# Patient Record
Sex: MALE | Race: WHITE | NOT HISPANIC OR LATINO | Employment: FULL TIME | URBAN - METROPOLITAN AREA
[De-identification: names, ages, dates, MRNs, and addresses within clinical notes are randomized per-mention and may not be internally consistent; named-entity substitution may affect disease eponyms.]

---

## 2018-06-28 ENCOUNTER — OFFICE VISIT - HEALTHEAST (OUTPATIENT)
Dept: FAMILY MEDICINE | Facility: CLINIC | Age: 38
End: 2018-06-28

## 2018-06-28 DIAGNOSIS — J01.90 ACUTE SINUSITIS WITH SYMPTOMS > 10 DAYS: ICD-10-CM

## 2018-06-28 DIAGNOSIS — H61.23 BILATERAL IMPACTED CERUMEN: ICD-10-CM

## 2018-06-28 ASSESSMENT — MIFFLIN-ST. JEOR: SCORE: 1472.38

## 2019-03-16 ENCOUNTER — OFFICE VISIT - HEALTHEAST (OUTPATIENT)
Dept: FAMILY MEDICINE | Facility: CLINIC | Age: 39
End: 2019-03-16

## 2019-03-16 DIAGNOSIS — H61.23 BILATERAL IMPACTED CERUMEN: ICD-10-CM

## 2019-03-16 DIAGNOSIS — R03.0 ELEVATED BLOOD PRESSURE READING WITHOUT DIAGNOSIS OF HYPERTENSION: ICD-10-CM

## 2021-06-01 VITALS — HEIGHT: 67 IN | WEIGHT: 132 LBS | BODY MASS INDEX: 20.72 KG/M2

## 2021-06-02 VITALS — BODY MASS INDEX: 22.95 KG/M2 | WEIGHT: 146.56 LBS

## 2021-06-17 NOTE — PATIENT INSTRUCTIONS - HE
Patient Instructions by Bakari Garner PA-C at 3/16/2019 11:00 AM     Author: Bakari Garner PA-C Service: -- Author Type: Physician Assistant    Filed: 3/16/2019 12:04 PM Encounter Date: 3/16/2019 Status: Addendum    : Bakari Garner PA-C (Physician Assistant)    Related Notes: Original Note by Bakari Garner PA-C (Physician Assistant) filed at 3/16/2019 12:01 PM       You had earwax removed with a curette on the right ear.  Because the ear wax adhered to the canal there was some bleeding on the edge of the canal.  Use a cotton ball or gauze in the ear canal to help catch any drainage.  Keep the ear canal clean dry and protected.  Do not immerse her head underwater for the next 3 days.  If you should develop a fever, discharge or pain that is worsening over the next 3-5 days from the ear return to clinic with walk-in care or your primary care provider for reevaluation and treatment of possible infection.  You may take over-the-counter Tylenol for comfort dosing by packaging directions.  Otherwise, follow-up in 10-14 days for reevaluation of the right ear and removal of the remaining cerumen.    Patient to follow up with Primary Care provider regarding elevated blood pressure.      Patient Education     Earwax Removal    The ear canal makes earwax from the canals lining. The ears make wax to lubricate and protect the ear canal. The ear canal is the tube that connects the middle ear to the outside of the ear. The wax protects the ear from bacteria, infection, and damage from water or trauma.  The wax that forms in the canal naturally moves toward the outside of the ear and falls out. In some cases, the ear may make too much wax. If the wax causes problems or keeps the healthcare provider from seeing into the ear, the extra wax may be removed.  Too much wax can affect your hearing. It can cause itching. In rare cases, it can be painful. Earwax should not be removed unless it is causing a problem. You should not  stick objects into your ear to remove wax unless told to do so by your healthcare provider.  Healthcare providers can remove earwax safely. It is important to stay still during the procedure to avoid damage to the ear canal. But removing earwax generally doesnt hurt. You will not usually need anesthesia or pain medicine when the provider removes the earwax.  A number of conditions lead to earwax buildup. These include some skin problems, a narrow ear canal, or ears that make too much earwax. Using cotton swabs in the canal pushes earwax deeper into the ear and contributes to the buildup of earwax.  Home care    The healthcare provider may recommend mineral oil or an over-the-counter eardrop to use at home to soften the earwax. Use these products only if the provider recommends them. Carefully follow the instructions given.    Dont use mineral oil or OTC eardrops if you might have an ear infection or a ruptured eardrum. Tell your healthcare provider right away if you have diabetes or an immune disorder.    Dont use cotton swabs in your ears. Cotton swabs may push wax deeper into the ear canal or damage the eardrum. Use cotton gauze or a wet washcloth  to gently remove wax on the outside of the ear and around the opening to the ear canal.    Don't use any probing device or object such as cotton-tipped swabs or cornell pins to clean the inside of your ears.    Dont use ear candles to clean your ears. Candling can be dangerous. It can burn the ear canal. It can also make the condition worse instead of better.    Dont use cold water to rinse the ear. This will make you dizzy. If your provider tells you to rinse your ear, use only warm water or follow his or her instructions.    Check the ear for signs of infection or irritation listed below under When to seek medical advice.  Steps for using eardrops  1. Warm the medicine bottle by rubbing it between your hands for a few minutes.  2. Lie down on your side, with the  affected ear up.  3. Place the recommended number of drops in the ear. Wet a cotton ball with the medicine. Gently put the cotton ball into the ear opening.  Follow-up care  Follow up with your healthcare provider, or as directed.  When to seek medical advice  Call the provider right away if you have:    Ear pain that gets worse    Fever of 100.4F F (38 C) or higher, or as directed by your healthcare provider    Worsening wax buildup    Severe pain, dizziness, or nausea    Bleeding from the ear    Hearing problems    Signs of irritation from the eardrops, such as burning, stinging, or swelling and tenderness    Foul-smelling fluid draining from the ear    Swelling, redness, or tenderness of the outer ear    Headache, neck pain, or stiff neck  Date Last Reviewed: 6/1/2017 2000-2017 [employer]. [address] [place] [id number]. All rights reserved. This information is not intended as a substitute for professional medical care. Always follow your healthcare professional's instructions.           Patient Education     Impacted Earwax     Inner ear structures including ear canal and eardrum.     Impacted earwax is a buildup of the natural wax in the ear (cerumen). Impacted earwax is very common. It can cause symptoms such as hearing loss. It can also make it difficult for a doctor to examine your ear.  Understanding earwax  Tiny glands in your ear make substances that combine with dead skin cells to form earwax. Earwax helps protect your ear canal from water, dirt, infection, and injury. Over time, earwax travels from the inner part of your ear canal to the entrance of the canal. Then it falls away naturally. But in some cases, it cant travel to the entrance of the canal. This may be because of a health condition or objects put in the ear. With age, earwax tends to become harder and less fluid. Older adults are more likely to have problems with earwax buildup.  What causes impacted  earwax?  Earwax can build up because of many health conditions. Some cause a physical blockage. Others cause too much earwax to be made. Health conditions that can cause earwax buildup include:    Use of cotton swabs to clean deep in the ear canal    Bony blockage in the ear (osteoma or exostoses)    Infections, such as  infection of the outer ear (external otitis)    Skin disease, such as eczema    Autoimmune diseases, such as lupus    A narrowed ear canal from birth, chronic inflammation, or injury    Too much earwax because of injury    Too much earwax because of  water in the ear canal  Objects repeatedly placed in the ear can also cause impacted earwax. For example, putting cotton swabs in the ear may push the wax deeper into the ear. Over time, this may cause blockage. Hearing aids, swimming plugs, and swim molds can cause the same problem when used again and again.  In some cases, the cause of impacted earwax is not known.  Symptoms of impacted earwax  Excess earwax usually does not cause any symptoms, unless there is a large amount of buildup. Then it may cause symptoms such as:    Hearing loss    Earache    Sense of ear fullness    Itching in the ear    Odor from the ear    Ear drainage    Dizziness    Ringing in the ears    Cough  Treatment for impacted earwax  If you dont have symptoms, you may not need treatment. Often, the earwax goes away on its own with time. If you have symptoms, you may have one or more treatments such as:    Eardrops to soften the earwax. This helps it leave the ear over time.    Rinsing (irrigation) of the ear canal with water. This is done in a doctors office.    Removal of the earwax with small tools. This is also done in a doctors office.  In rare cases, some treatments for earwax removal may cause complications such as:    Infection of the outer ear (otitis external)    Earache    Short-term hearing loss    Dizziness    Water trapped in the ear canal    Hole in the  eardrum    Ringing in the ears    Bleeding from the ear  Talk with your healthcare provider about which risks apply most to you.  Dont use these at home  Healthcare providers do not advise use of ear candles or ear vacuum kits. These methods are not shown to work and may cause problems.   Preventing impacted earwax  You may not be able to prevent impacted earwax if you have a health condition that causes it, such as eczema. In other cases, you may be able to prevent earwax buildup by:    Using ear drops once a week    Having routine cleaning of the ear about every 6 months    Not using cotton swabs in the ear  When to call the healthcare provider  Call your healthcare provider if you have symptoms of impacted earwax. Also call right away if you have severe symptoms after earwax removal. These may include bleeding or severe ear pain.   Date Last Reviewed: 5/1/2017 2000-2017 [employer]. [address] [place] [address], [place] [address]. All rights reserved. This information is not intended as a substitute for professional medical care. Always follow your healthcare professional's instructions.

## 2021-06-19 NOTE — PROGRESS NOTES
"OFFICE VISIT - FAMILY MEDICINE     ASSESSMENT AND PLAN     1. Acute sinusitis with symptoms > 10 days  amoxicillin (AMOXIL) 875 MG tablet   2. Bilateral impacted cerumen     Continue with good hydration, extra vitamin C, cough drops as needed, take the prescribed antibiotic as directed, possible side effect discussed.  Return if not improving.  Trial of over-the-counter Debrox at home and follow-up here as needed.    CHIEF COMPLAINT   Facial Pain (X 06/22)    HPI   Sanjay Gomez is a 37 y.o. male.  No Patient Care Coordination Note on file.    Cold symptoms about 2 weeks ago, lasted for about 1 week, better with symptomatic care, but now feeling more pressure in his maxillary sinuses area, not improving with symptomatic care like Sudafed or nasal washes, has had one about a year ago treated with antibiotic.  Denies any headaches or dizziness no hearing loss.      Review of Systems As per HPI, otherwise negative.    OBJECTIVE   /80 (Patient Site: Left Arm)  Pulse 64  Temp 98.7  F (37.1  C) (Oral)   Resp 13  Ht 5' 7\" (1.702 m)  Wt 132 lb (59.9 kg)  BMI 20.67 kg/m2  Physical Exam   Constitutional: He is oriented to person, place, and time. He appears well-developed and well-nourished.   HENT:   Head: Normocephalic and atraumatic.   Nose: Right sinus exhibits maxillary sinus tenderness. Left sinus exhibits maxillary sinus tenderness.   Bilateral cerumen impaction with no pain   Neck: Normal range of motion. Neck supple. No JVD present. No tracheal deviation present. No thyromegaly present.   Cardiovascular: Normal rate, regular rhythm, normal heart sounds and intact distal pulses.  Exam reveals no gallop and no friction rub.    No murmur heard.  Pulmonary/Chest: Effort normal and breath sounds normal. No respiratory distress. He has no wheezes. He has no rales.   Musculoskeletal: He exhibits no edema or tenderness.   Lymphadenopathy:     He has no cervical adenopathy.   Neurological: He is alert and " oriented to person, place, and time. Coordination normal.   Psychiatric: He has a normal mood and affect. Judgment and thought content normal.       [facility]     Family History   Problem Relation Age of Onset     Hypertension Maternal Grandmother      Social History     Social History     Marital status:      Spouse name: N/A     Number of children: N/A     Years of education: N/A     Occupational History     Not on file.     Social History Main Topics     Smoking status: Never Smoker     Smokeless tobacco: Never Used     Alcohol use 0.6 oz/week     1 Standard drinks or equivalent per week     Drug use: No     Sexual activity: Not on file     Other Topics Concern     Not on file     Social History Narrative     Relevant history was reviewed with the patient today, unless noted in HPI, nothing is pertinent for this visit.  [facility]   There are no active problems to display for this patient.    No past surgical history on file.    RESULTS/CONSULTS (Lab/Rad)   No results found for this or any previous visit (from the past 168 hour(s)).  No results found.  MEDICATIONS     No current outpatient prescriptions on file prior to visit.     No current facility-administered medications on file prior to visit.        HEALTH MAINTENANCE / SCREENING   No Data Recorded, No Data Recorded,No Data Recorded  Immunization History   Administered Date(s) Administered     Gamma Globulin 03/29/1995     MMR 05/18/1994     Td, adult adsorbed, PF 05/18/2009     Td,adult,historic,unspecified 05/18/2009     Tdap 05/18/2009     Health Maintenance   Topic     TDAP ADULT ONE TIME DOSE      ADVANCE DIRECTIVES DISCUSSED WITH PATIENT      INFLUENZA VACCINE RULE BASED (Season Ended)     TD 18+ HE        Molly Franklin MD  Family Medicine, [facility]     This note was dictated using a voice recognition software.  Any grammatical or context distortion are unintentional and inherent to the software.

## 2021-06-27 NOTE — PROGRESS NOTES
Progress Notes by Bakari Garner PA-C at 3/16/2019 11:00 AM     Author: Bakari Garner PA-C Service: -- Author Type: Physician Assistant    Filed: 3/16/2019 12:57 PM Encounter Date: 3/16/2019 Status: Signed    : Bakari Garner PA-C (Physician Assistant)       Subjective:      Patient ID: Sanjay Gomez is a 38 y.o. male.    Chief Complaint:    HPI  Sanjay Gomez is a 38 y.o. male who presents today complaining of right-sided ear pain and plugging with decrease in hearing over the last 3 days.  Patient recounts past medical history for going to his hairdresser and had the hairdresser spray water in the right ear.  It was painful and he had then decrease in hearing because of the blockage.    Incidental note of elevated blood pressure noted in the office.    No past medical history on file.    No past surgical history on file.    Family History   Problem Relation Age of Onset   ? Hypertension Maternal Grandmother        Social History     Tobacco Use   ? Smoking status: Never Smoker   ? Smokeless tobacco: Never Used   Substance Use Topics   ? Alcohol use: Yes     Alcohol/week: 0.6 oz     Types: 1 Standard drinks or equivalent per week   ? Drug use: No       Review of Systems  As above in HPI, otherwise balance of Review of Systems are negative.    Objective:     /76 (Patient Site: Right Arm, Patient Position: Sitting, Cuff Size: Adult Regular)   Pulse 75   Temp 98.4  F (36.9  C) (Oral)   Resp 16   Wt 146 lb 9 oz (66.5 kg)   SpO2 98%   BMI 22.95 kg/m      Physical Exam  General: Patient is resting comfortably no acute distress is afebrile  HEENT: Head is normocephalic atraumatic   eyes are PERRL EOMI sclera anicteric   TMs on the right obstructed by cerumen impaction.  Using a curette the cerumen was removed.  There was some bleeding off of the external auditory canal because of the adherence of the cerumen.  A very large plug of cerumen was removed.  TM on the right was not fully visualized  secondary to the bleeding and still some cerumen remaining up against the tympanic membrane.  EAC on the left is with mild cerumen.  Using lavage the left external auditory canal was cleaned   TM on the left is clear  No cervical lymphadenopathy present  Skin: Without rash non-diaphoretic    Assessment:     Procedures    The primary encounter diagnosis was Elevated blood pressure reading without diagnosis of hypertension. A diagnosis of Bilateral impacted cerumen was also pertinent to this visit.    Plan:       1. Elevated blood pressure reading without diagnosis of hypertension     2. Bilateral impacted cerumen  Nursing communication       Advised the patient that there was some bleeding from the right external auditory canal after removal of the cerumen with a curette.  Advised him that this is potentially common by removing the wax that adheres to the side of the canal during the dry winter months like this.  He should keep the head clean dry and protected with not immersing the external auditory canal in water or swimming.  Indication for return to clinic was gone over if he should develop signs or symptoms of infection.  He can have the ear evaluated on the right in 2 weeks to remove the remaining cerumen from the external auditory canal.  Shins were answered to patient's satisfaction before discharge    Patient Instructions     You had earwax removed with a curette on the right ear.  Because the ear wax adhered to the canal there was some bleeding on the edge of the canal.  Use a cotton ball or gauze in the ear canal to help catch any drainage.  Keep the ear canal clean dry and protected.  Do not immerse her head underwater for the next 3 days.  If you should develop a fever, discharge or pain that is worsening over the next 3-5 days from the ear return to clinic with walk-in care or your primary care provider for reevaluation and treatment of possible infection.  You may take over-the-counter Tylenol for  comfort dosing by packaging directions.  Otherwise, follow-up in 10-14 days for reevaluation of the right ear and removal of the remaining cerumen.    Patient to follow up with Primary Care provider regarding elevated blood pressure.      Patient Education     Earwax Removal    The ear canal makes earwax from the canals lining. The ears make wax to lubricate and protect the ear canal. The ear canal is the tube that connects the middle ear to the outside of the ear. The wax protects the ear from bacteria, infection, and damage from water or trauma.  The wax that forms in the canal naturally moves toward the outside of the ear and falls out. In some cases, the ear may make too much wax. If the wax causes problems or keeps the healthcare provider from seeing into the ear, the extra wax may be removed.  Too much wax can affect your hearing. It can cause itching. In rare cases, it can be painful. Earwax should not be removed unless it is causing a problem. You should not stick objects into your ear to remove wax unless told to do so by your healthcare provider.  Healthcare providers can remove earwax safely. It is important to stay still during the procedure to avoid damage to the ear canal. But removing earwax generally doesnt hurt. You will not usually need anesthesia or pain medicine when the provider removes the earwax.  A number of conditions lead to earwax buildup. These include some skin problems, a narrow ear canal, or ears that make too much earwax. Using cotton swabs in the canal pushes earwax deeper into the ear and contributes to the buildup of earwax.  Home care    The healthcare provider may recommend mineral oil or an over-the-counter eardrop to use at home to soften the earwax. Use these products only if the provider recommends them. Carefully follow the instructions given.    Dont use mineral oil or OTC eardrops if you might have an ear infection or a ruptured eardrum. Tell your healthcare provider right  away if you have diabetes or an immune disorder.    Dont use cotton swabs in your ears. Cotton swabs may push wax deeper into the ear canal or damage the eardrum. Use cotton gauze or a wet washcloth  to gently remove wax on the outside of the ear and around the opening to the ear canal.    Don't use any probing device or object such as cotton-tipped swabs or cornell pins to clean the inside of your ears.    Dont use ear candles to clean your ears. Candling can be dangerous. It can burn the ear canal. It can also make the condition worse instead of better.    Dont use cold water to rinse the ear. This will make you dizzy. If your provider tells you to rinse your ear, use only warm water or follow his or her instructions.    Check the ear for signs of infection or irritation listed below under When to seek medical advice.  Steps for using eardrops  1. Warm the medicine bottle by rubbing it between your hands for a few minutes.  2. Lie down on your side, with the affected ear up.  3. Place the recommended number of drops in the ear. Wet a cotton ball with the medicine. Gently put the cotton ball into the ear opening.  Follow-up care  Follow up with your healthcare provider, or as directed.  When to seek medical advice  Call the provider right away if you have:    Ear pain that gets worse    Fever of 100.4F F (38 C) or higher, or as directed by your healthcare provider    Worsening wax buildup    Severe pain, dizziness, or nausea    Bleeding from the ear    Hearing problems    Signs of irritation from the eardrops, such as burning, stinging, or swelling and tenderness    Foul-smelling fluid draining from the ear    Swelling, redness, or tenderness of the outer ear    Headache, neck pain, or stiff neck  Date Last Reviewed: 6/1/2017 2000-2017 [employer]. [address] [place] [address], [place] [address]. All rights reserved. This information is not intended as a substitute for professional medical care. Always  follow your healthcare professional's instructions.           Patient Education     Impacted Earwax     Inner ear structures including ear canal and eardrum.     Impacted earwax is a buildup of the natural wax in the ear (cerumen). Impacted earwax is very common. It can cause symptoms such as hearing loss. It can also make it difficult for a doctor to examine your ear.  Understanding earwax  Tiny glands in your ear make substances that combine with dead skin cells to form earwax. Earwax helps protect your ear canal from water, dirt, infection, and injury. Over time, earwax travels from the inner part of your ear canal to the entrance of the canal. Then it falls away naturally. But in some cases, it cant travel to the entrance of the canal. This may be because of a health condition or objects put in the ear. With age, earwax tends to become harder and less fluid. Older adults are more likely to have problems with earwax buildup.  What causes impacted earwax?  Earwax can build up because of many health conditions. Some cause a physical blockage. Others cause too much earwax to be made. Health conditions that can cause earwax buildup include:    Use of cotton swabs to clean deep in the ear canal    Bony blockage in the ear (osteoma or exostoses)    Infections, such as  infection of the outer ear (external otitis)    Skin disease, such as eczema    Autoimmune diseases, such as lupus    A narrowed ear canal from birth, chronic inflammation, or injury    Too much earwax because of injury    Too much earwax because of  water in the ear canal  Objects repeatedly placed in the ear can also cause impacted earwax. For example, putting cotton swabs in the ear may push the wax deeper into the ear. Over time, this may cause blockage. Hearing aids, swimming plugs, and swim molds can cause the same problem when used again and again.  In some cases, the cause of impacted earwax is not known.  Symptoms of impacted earwax  Excess  earwax usually does not cause any symptoms, unless there is a large amount of buildup. Then it may cause symptoms such as:    Hearing loss    Earache    Sense of ear fullness    Itching in the ear    Odor from the ear    Ear drainage    Dizziness    Ringing in the ears    Cough  Treatment for impacted earwax  If you dont have symptoms, you may not need treatment. Often, the earwax goes away on its own with time. If you have symptoms, you may have one or more treatments such as:    Eardrops to soften the earwax. This helps it leave the ear over time.    Rinsing (irrigation) of the ear canal with water. This is done in a doctors office.    Removal of the earwax with small tools. This is also done in a doctors office.  In rare cases, some treatments for earwax removal may cause complications such as:    Infection of the outer ear (otitis external)    Earache    Short-term hearing loss    Dizziness    Water trapped in the ear canal    Hole in the eardrum    Ringing in the ears    Bleeding from the ear  Talk with your healthcare provider about which risks apply most to you.  Dont use these at home  Healthcare providers do not advise use of ear candles or ear vacuum kits. These methods are not shown to work and may cause problems.   Preventing impacted earwax  You may not be able to prevent impacted earwax if you have a health condition that causes it, such as eczema. In other cases, you may be able to prevent earwax buildup by:    Using ear drops once a week    Having routine cleaning of the ear about every 6 months    Not using cotton swabs in the ear  When to call the healthcare provider  Call your healthcare provider if you have symptoms of impacted earwax. Also call right away if you have severe symptoms after earwax removal. These may include bleeding or severe ear pain.   Date Last Reviewed: 5/1/2017 2000-2017 [employer]. [address] [place] [address], [place] [address]. All rights reserved. This  information is not intended as a substitute for professional medical care. Always follow your healthcare professional's instructions.

## 2021-08-07 ENCOUNTER — HEALTH MAINTENANCE LETTER (OUTPATIENT)
Age: 41
End: 2021-08-07

## 2021-10-02 ENCOUNTER — HEALTH MAINTENANCE LETTER (OUTPATIENT)
Age: 41
End: 2021-10-02

## 2022-09-03 ENCOUNTER — HEALTH MAINTENANCE LETTER (OUTPATIENT)
Age: 42
End: 2022-09-03

## 2023-01-19 ENCOUNTER — OFFICE VISIT (OUTPATIENT)
Dept: URGENT CARE | Facility: URGENT CARE | Age: 43
End: 2023-01-19
Payer: COMMERCIAL

## 2023-01-19 VITALS
BODY MASS INDEX: 21.19 KG/M2 | RESPIRATION RATE: 16 BRPM | OXYGEN SATURATION: 99 % | DIASTOLIC BLOOD PRESSURE: 83 MMHG | HEART RATE: 93 BPM | TEMPERATURE: 98.6 F | WEIGHT: 135 LBS | HEIGHT: 67 IN | SYSTOLIC BLOOD PRESSURE: 136 MMHG

## 2023-01-19 DIAGNOSIS — R21 RASH AND NONSPECIFIC SKIN ERUPTION: Primary | ICD-10-CM

## 2023-01-19 PROCEDURE — 99203 OFFICE O/P NEW LOW 30 MIN: CPT | Performed by: FAMILY MEDICINE

## 2023-01-19 NOTE — PROGRESS NOTES
"Assessment & Plan     Rash and nonspecific skin eruption       Mild Eczematous rash for this I am recommending non-fragrant emollients for maintaining skin hydration, avoidance of prolonged showers as this can damage skin especially in the cold winter months, and as needed use of a low potency steroid.      This does not have suggestive features typical of shingles, psoriasis, or an infectious process such as folliculitis/cellulitis, bed bugs or scabies.       Salo Warner MD   [facility]    Ashley Grace is a 42 year old male who presents to clinic today for the following health issues:  Chief Complaint   Patient presents with     Rash     About a week ago Pt noticed rash pop up on rt arm and on both thighs, bumpy, red, were itchy but not anymore      HPI  New rash that started in the last week, initialy itching slight bumpy texture. Ongoing but not worsening - presenting on arms and anterior thighs.   Has tried kids lotion  Acommpanied by his wife who is a pharmacist    No recent fever or cough.       Patient Active Problem List    Diagnosis Date Noted     Pain in joint, pelvic region and thigh 07/03/2013     Priority: Medium     CARDIOVASCULAR SCREENING; LDL GOAL LESS THAN 160 02/27/2012     Priority: Medium     Current Outpatient Medications   Medication     clotrimazole (LOTRIMIN) 1 % cream     griseofulvin microsize (GRIFULVIN V) 500 MG TABS     No current facility-administered medications for this visit.         Objective    /83 (BP Location: Left arm, Patient Position: Sitting, Cuff Size: Adult Regular)   Pulse 93   Temp 98.6  F (37  C) (Temporal)   Resp 16   Ht 1.702 m (5' 7\")   Wt 61.2 kg (135 lb)   SpO2 99%   BMI 21.14 kg/m    Physical Exam   Hands: no involvement  Face: no rash  Arms/anterior thighs: scattered small sections  < 0.5 inches of dry skin with erythematous macules < 1 cm in size          No results found for any visits on 01/19/23.          The use " of Dragon/AddMyBestation services may have been used to construct the content in this note; any grammatical or spelling errors are non-intentional. Please contact the author of this note directly if you are in need of any clarification.

## 2023-01-19 NOTE — PATIENT INSTRUCTIONS
Luke warm showers try to be in and out in less than a few minutes      Hydrate the skin with vanicream, non-fragrant lotion, or lac hydrin cream      Consider switching to dove/unscented soap      If areas are itching and bothersome use 1% hydrocortisone cream steroid twice a day (then apply lotion on top)       Return if symptoms worsen

## 2023-02-22 ENCOUNTER — OFFICE VISIT (OUTPATIENT)
Dept: FAMILY MEDICINE | Facility: CLINIC | Age: 43
End: 2023-02-22
Payer: COMMERCIAL

## 2023-02-22 ENCOUNTER — ANCILLARY PROCEDURE (OUTPATIENT)
Dept: GENERAL RADIOLOGY | Facility: CLINIC | Age: 43
End: 2023-02-22
Attending: STUDENT IN AN ORGANIZED HEALTH CARE EDUCATION/TRAINING PROGRAM
Payer: COMMERCIAL

## 2023-02-22 VITALS
OXYGEN SATURATION: 99 % | BODY MASS INDEX: 19.85 KG/M2 | DIASTOLIC BLOOD PRESSURE: 84 MMHG | WEIGHT: 131 LBS | HEIGHT: 68 IN | RESPIRATION RATE: 16 BRPM | HEART RATE: 81 BPM | SYSTOLIC BLOOD PRESSURE: 131 MMHG | TEMPERATURE: 98.4 F

## 2023-02-22 DIAGNOSIS — L30.9 DERMATITIS: ICD-10-CM

## 2023-02-22 DIAGNOSIS — B35.9 RINGWORM: Primary | ICD-10-CM

## 2023-02-22 DIAGNOSIS — S69.92XA FINGER INJURY, LEFT, INITIAL ENCOUNTER: ICD-10-CM

## 2023-02-22 PROCEDURE — 73130 X-RAY EXAM OF HAND: CPT | Mod: TC | Performed by: RADIOLOGY

## 2023-02-22 PROCEDURE — 99213 OFFICE O/P EST LOW 20 MIN: CPT | Performed by: STUDENT IN AN ORGANIZED HEALTH CARE EDUCATION/TRAINING PROGRAM

## 2023-02-22 RX ORDER — MICONAZOLE NITRATE 20 MG/G
CREAM TOPICAL 2 TIMES DAILY
Qty: 35 G | Refills: 0 | Status: SHIPPED | OUTPATIENT
Start: 2023-02-22

## 2023-02-22 ASSESSMENT — PAIN SCALES - GENERAL: PAINLEVEL: NO PAIN (0)

## 2023-02-22 NOTE — PATIENT INSTRUCTIONS
-Use antifungal cream 2x/day for 2-4 weeks (or until the rash goes away).  -Wash all clothes, bedding, towels.  -Avoid really hot showers.  -Moisturize 2x/day.       For other rash on arm  -try hydrocortisone 2x/day    Referral to orthopedics  XR scheduled for finger      Dr. Proctor

## 2023-02-22 NOTE — PROGRESS NOTES
Assessment & Plan     Ringworm  Flat, erythematous, itchy patch over L forearm near elbow. Appears consistent with ringworm. Will treat with antifungal BID for 2-4 weeks. Information given in AVS.  - miconazole (MICATIN) 2 % external cream; Apply topically 2 times daily Apply to rash on L arm.    Dermatitis  Erythematous papules scattered over L forearm. No pustules. Appears to be dermatitis. Recommend hydrocortisone BID for 2-4 weeks on arm. Should NOT put steroid cream over fungal rash. Continue moisturizing BID with vanicream.    Finger injury, left, initial encounter  L ring finger is stuck in flexion. Unable to fully extend. Noticed after injury after falling while skateboarding 4 months ago. No swelling. Suspect trigger finger vs tendon injury. Will get XR and refer to orthopedics.  - XR Hand Left G/E 3 Views; Future  - Orthopedic  Referral; Future      I spent a total of 24 minutes on the day of the visit.   Time spent doing chart review, history and exam, documentation and further activities per the note       No follow-ups on file.    Jann Proctor [facility]    Ashley Grace is a 42 year old presenting for the following health issues:  Rash      Rash  Associated symptoms include a rash.   History of Present Illness       Reason for visit:  Primary issue is rash on right arm. Also, stiff left index finger.  Symptom onset:  1-2 weeks ago  Symptoms include:  Inflammation on arm where rash is, mild itchiness  Symptom intensity:  Mild  Symptom progression:  Staying the same  Had these symptoms before:  No  What makes it worse:  Exercise, hot water  What makes it better:  Icing affected area, lotion    He eats 4 or more servings of fruits and vegetables daily.He consumes 0 sweetened beverage(s) daily.He exercises with enough effort to increase his heart rate 10 to 19 minutes per day.  He exercises with enough effort to increase his heart rate 4 days per  "week.   He is taking medications regularly.     Pt state here to follow up on rash from last visit state its looks like rash is spreading.    Rash:  -started as red bumps, forearms  -flared up near R elbow    L ringfinger:  -injured skateboarding, fell  -ring finger    Review of Systems   Skin: Positive for rash.            Objective    /84 (BP Location: Left arm, Patient Position: Chair, Cuff Size: Adult Regular)   Pulse 81   Temp 98.4  F (36.9  C) (Temporal)   Resp 16   Ht 1.727 m (5' 8\")   Wt 59.4 kg (131 lb)   SpO2 99%   BMI 19.92 kg/m    Body mass index is 19.92 kg/m .     Physical Exam  Constitutional:       General: He is not in acute distress.     Appearance: He is not ill-appearing.   Pulmonary:      Effort: Pulmonary effort is normal.   Musculoskeletal:      Comments: L ring finger is stuck in flexion. Unable to actively extend w/o pain. No joint swelling. No skin changes.   Skin:     Comments: Well circumscribed erythematous patches over L forearm near elbow. Itchy. Appears consistent with ringworm.    Lower L forearm has scattered erythematous papules.    Neurological:      Mental Status: He is alert.                    "

## 2023-03-09 NOTE — TELEPHONE ENCOUNTER
DIAGNOSIS: left ring finger   APPOINTMENT DATE: 3.20.23   NOTES STATUS DETAILS   OFFICE NOTE from referring provider Internal 2.22.23 Jann Proctor DO   MEDICATION LIST Internal    XRAYS (IMAGES & REPORTS) Internal 2.22.23 L hand

## 2023-03-20 ENCOUNTER — PRE VISIT (OUTPATIENT)
Dept: ORTHOPEDICS | Facility: CLINIC | Age: 43
End: 2023-03-20
Payer: COMMERCIAL

## 2023-03-20 ENCOUNTER — OFFICE VISIT (OUTPATIENT)
Dept: ORTHOPEDICS | Facility: CLINIC | Age: 43
End: 2023-03-20
Payer: COMMERCIAL

## 2023-03-20 DIAGNOSIS — M25.642 FINGER STIFFNESS, LEFT: Primary | ICD-10-CM

## 2023-03-20 PROCEDURE — 99203 OFFICE O/P NEW LOW 30 MIN: CPT | Performed by: FAMILY MEDICINE

## 2023-03-20 NOTE — LETTER
3/20/2023      RE: Sanjay Gomez  [address] [facility] [id number]     Dear Colleague,    Thank you for referring your patient, Sanjay Gomez, to the [facility]. Please see a copy of my visit note below.      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  No significant structural injury or instability is suspected.  This is likely mostly soft tissue, possibly from volar plate injury    Reviewed imaging and assessment with patient in detail  Recommended initial treatment with hand therapy.  May consider progressive splinting.  We will follow-up in clinic in 2 months.  Sooner if new or worsening symptoms    Andrew Mercer MD  [facility]    -----  Chief Complaint   Patient presents with     Left Hand - Pain       SUBJECTIVE  Sanjay Gomez is a/an 42 year old male who is seen as a self referral for evaluation of  Left ring finger injury.     The patient is seen by themselves.  The patient is Right handed    Onset: Fall 2022. Patient describes injury as falling off a skateboard catching himself with that hand and jamming his finger.   Location of Pain: left ring finger   Worsened by: Extension   Better with: NA  Treatments tried: no treatment tried to date  Associated symptoms: Cannot extend finger     Orthopedic/Surgical history: NO  Social History/Occupation:        REVIEW OF SYSTEMS:    Do you have fever, chills, weight loss? No    Do you have any vision problems? No    Do you have any chest pain or edema? No    Do you have any shortness of breath or wheezing?  No    Do you have stomach problems? No    Do you have any numbness or focal weakness? No    Do you have diabetes? No    Do you have problems with bleeding or clotting? No    Do you have  an rashes or other skin lesions? No    OBJECTIVE:  There were no vitals taken for this visit.     General  - alert, pleasant, no distress  CV  - normal radial pulse, cap refill brisk  Musculoskeletal -left ring finger  - inspection: no atrophy, normal joint alignment, no swelling  - palpation: no bony or soft tissue tenderness, no tenderness at the anatomical snuffbox  - PROM:  MCP 90 deg flexion   0 deg extension    deg flexion   45 deg extension   DIP 80 deg flexion   0 deg extension  - strength: 5/5  strength, 5/5 wrist abduction, 5/5 flexion, extension, pronation, supination, adduction  - special tests:  (-) varus  (-) valgus  Neuro  - no numbness, no motor deficit, grossly normal coordination, normal muscle tone  Skin  - no ecchymosis, erythema, warmth, or induration, no obvious rash    RADIOLOGY:    Three-view x-rays of the left hand are performed on 2/22/2023 and reviewed independently demonstrating no acute fracture or dislocation.  No significant DJD.       Again, thank you for allowing me to participate in the care of your patient.      Sincerely,    Andrew Mercer MD

## 2023-03-20 NOTE — PROGRESS NOTES
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  No significant structural injury or instability is suspected.  This is likely mostly soft tissue, possibly from volar plate injury    Reviewed imaging and assessment with patient in detail  Recommended initial treatment with hand therapy.  May consider progressive splinting.  We will follow-up in clinic in 2 months.  Sooner if new or worsening symptoms    Andrew Mercer MD  [facility] [place]    -----  Chief Complaint   Patient presents with     Left Hand - Pain       SUBJECTIVE  Sanjay Gomez is a/an 42 year old male who is seen as a self referral for evaluation of  Left ring finger injury.     The patient is seen by themselves.  The patient is Right handed    Onset: Fall 2022. Patient describes injury as falling off a skateboard catching himself with that hand and jamming his finger.   Location of Pain: left ring finger   Worsened by: Extension   Better with: NA  Treatments tried: no treatment tried to date  Associated symptoms: Cannot extend finger     Orthopedic/Surgical history: NO  Social History/Occupation:        REVIEW OF SYSTEMS:    Do you have fever, chills, weight loss? No    Do you have any vision problems? No    Do you have any chest pain or edema? No    Do you have any shortness of breath or wheezing?  No    Do you have stomach problems? No    Do you have any numbness or focal weakness? No    Do you have diabetes? No    Do you have problems with bleeding or clotting? No    Do you have an rashes or other skin lesions? No    OBJECTIVE:  There were no vitals taken for this visit.     General  - alert, pleasant, no distress  CV  - normal radial pulse, cap refill brisk  Musculoskeletal -left ring finger  - inspection: no atrophy, normal joint alignment, no  swelling  - palpation: no bony or soft tissue tenderness, no tenderness at the anatomical snuffbox  - PROM:  MCP 90 deg flexion   0 deg extension    deg flexion   45 deg extension   DIP 80 deg flexion   0 deg extension  - strength: 5/5  strength, 5/5 wrist abduction, 5/5 flexion, extension, pronation, supination, adduction  - special tests:  (-) varus  (-) valgus  Neuro  - no numbness, no motor deficit, grossly normal coordination, normal muscle tone  Skin  - no ecchymosis, erythema, warmth, or induration, no obvious rash          RADIOLOGY:    Three-view x-rays of the left hand are performed on 2/22/2023 and reviewed independently demonstrating no acute fracture or dislocation.  No significant DJD.

## 2023-03-22 ENCOUNTER — THERAPY VISIT (OUTPATIENT)
Dept: OCCUPATIONAL THERAPY | Facility: CLINIC | Age: 43
End: 2023-03-22
Attending: FAMILY MEDICINE
Payer: COMMERCIAL

## 2023-03-22 DIAGNOSIS — M79.645 PAIN OF FINGER OF LEFT HAND: ICD-10-CM

## 2023-03-22 DIAGNOSIS — M25.642 FINGER STIFFNESS, LEFT: Primary | ICD-10-CM

## 2023-03-22 PROCEDURE — 97760 ORTHOTIC MGMT&TRAING 1ST ENC: CPT | Mod: GO | Performed by: OCCUPATIONAL THERAPIST

## 2023-03-22 PROCEDURE — 97110 THERAPEUTIC EXERCISES: CPT | Mod: GO | Performed by: OCCUPATIONAL THERAPIST

## 2023-03-22 PROCEDURE — 97165 OT EVAL LOW COMPLEX 30 MIN: CPT | Mod: GO | Performed by: OCCUPATIONAL THERAPIST

## 2023-03-22 NOTE — PROGRESS NOTES
Hand Therapy Initial Evaluation    Current Date:  3/22/2023    Diagnosis: left ring finger PIP contracture  DOI:  9/15/2022  Post:  ~ 6 months    Precautions: none    Subjective:  Sanjay Gomez is a 42 year old male.    Answers for HPI/ROS submitted by the patient on 3/22/2023  Reason for Visit:: Left ring finger injury  How problem occurred:: Fell backwards off a skateboard and jammed finger  Number scale: 2/10  General health as reported by patient: excellent  Medical allergies: none  Surgeries: none  Occupation::   What are your primary job tasks: computer work    Occupational Profile Information:  Right hand dominant  Prior functional level:  no limitations  Patient reports symptoms of pain and stiffness/loss of motion  Special tests:  x-ray.    Previous treatment: none  Barriers include:none  Mobility: No difficulty  Transportation: drives  Currently working in normal job without restrictions  Leisure activities/hobbies: piano, taking lessons; basketball, tennis  Other: two boys 3, 6    Functional Outcome Measure:   Upper Extremity Functional Index Score:  SCORE:   Column Totals: /80: (P) 76   (A lower score indicates greater disability.)    Objective:  Pain Level (Scale 0-10)   3/22/2023   At Rest 0   With Use Pushing while the finger is in some extension - mild 2/10  Can be moderate  At worst - really bent 8/10     Pain Description  Date 3/22/2023   Location ring finger   Pain Quality Sharp with a sudden straightening. Can be bothered with basketball,    Frequency intermittent     Pain is worst  daytime   Exacerbated by  see above   Relieved by rest   Progression Motion has not improved     Edema  None visible to L RF PIPj    Sensation   WNL throughout all nerve distributions; per patient report    ROM  ring Finger 3/22/2023 3/22/2023 3/22/2023     AROM (PROM) R L pre L post   MCP / HE/110    PIP HE/ -45/100  (-25/) -27/   DIP / 0/75    CONLEY          Strength:  Pain-free /Pinch Test     3/22/2023   Trials R L   1   65  70 66     3 Pt Pinch  3/22/2023   Trials R L   1   21 23       Assessment:  Patient presents with symptoms consistent with condition as noted above.    Patient's limitations or Problem List includes: pain,  decreased ROM and   of the left ring finger which interferes with the patient's ability to perform ADLs and instrumental activities of daily living as compared to previous level of function.    Rehab Potential:  Excellent, expect return to normal activities.    Patient will benefit from skilled Occupational Therapy to increase ROM and decrease pain, to return to previous activity level and resume normal daily tasks and to reach their rehab potential.    Barriers to Learning:  no barriers    Communication Issues:  Patient appears to be able to clearly communicate and understand verbal and written communication and follow directions correctly.    Chart Review: Brief history including review of medical and/or therapy records relating to the presenting problem and Simple history review with patient    Identified Performance Deficits: work , recreation and home establishment and management.  Assessment of Occupational Performance:  3-5 Performance Deficits    Clinical Decision Making (Complexity): Low complexity    Treatment Explanation:  The following has been discussed with the patient:  RX ordered/plan of care  Anticipated outcomes  Possible risks and side effects    Plan:  Frequency:  1 X week, once daily  Duration:  for 4 weeks tapering to 2 X a month over 8 weeks    Treatment Plan:   Modalities:  US and Paraffin  Therapeutic Exercise:  AROM, AAROM, PROM, Tendon Gliding, Reverse Blocking, Extensor Tracking, Isotonics, Isometrics and Stabilization  Neuromuscular re-education:  Proprioceptive Training  Manual Techniques:  Joint mobilization and Myofascial release  Orthotic Fabrication:  Static, Static progressive and Finger based  Self Care:  Self Care Tasks, Ergonomic  Considerations and Work Tasks    Discharge Plan:  Achieve all LTG.  Independent in home treatment program.  Reach maximal therapeutic benefit.    Home Program:   3/22/2023  Heat, tracking, provided sleeve for making home hot pack with rice if needed (does have hotpack at home)  Custom splint for night, PIPj in ~ -20-25 ext, DIPj included    Next visit/ Plan:   Check ROM  Remake or adjust splint - working towards 0 degrees ext passively  Splint for night  Exercise for day  Paraffin, heat  Stretch  Massage if needed    Thank you for the opportunity to work with this patient.   If you have questions or concerns, please contact me with an in basket message or via the information below.   Please excuse any minor spelling or grammar issues related to dictation or typing method.  If you notice significant errors, please contact me and I will promptly make corrections.    Gabriella Tarango MS, OTR/L, CHT  Certified Hand Therapist    [facility] - [facility]  [address] [place] [address], [address], [place] [address]    Email: [email or web address]   Phone / Voicemail: [phone]   Hand Therapy  phone: [phone] (staffed M-F)  Fax: [phone]

## 2023-03-28 ENCOUNTER — THERAPY VISIT (OUTPATIENT)
Dept: OCCUPATIONAL THERAPY | Facility: CLINIC | Age: 43
End: 2023-03-28
Payer: COMMERCIAL

## 2023-03-28 DIAGNOSIS — M79.645 PAIN OF FINGER OF LEFT HAND: Primary | ICD-10-CM

## 2023-03-28 PROCEDURE — 97110 THERAPEUTIC EXERCISES: CPT | Mod: GO | Performed by: OCCUPATIONAL THERAPIST

## 2023-03-28 PROCEDURE — 97763 ORTHC/PROSTC MGMT SBSQ ENC: CPT | Mod: GO | Performed by: OCCUPATIONAL THERAPIST

## 2023-03-28 NOTE — PROGRESS NOTES
SOAP note objective information for 3/28/2023.  Please refer to the daily flowsheet for treatment today, total treatment time and time spent performing 1:1 timed codes.     Diagnosis: Left ring finger PIP contracture  DOI:  9/15/2022  Post:  ~ 6 months    Pain Level (Scale 0-10)   3/22/2023   At Rest 0   With Use Pushing while the finger is in some extension - mild 2/10  Can be moderate  At worst - really bent 8/10     Pain Description  Date 3/22/2023   Location ring finger   Pain Quality Sharp with a sudden straightening. Can be bothered with basketball,    Frequency intermittent     Pain is worst  daytime   Exacerbated by  see above   Relieved by rest   Progression Motion has not improved     Edema  None visible to L RF PIPj    Sensation   WNL throughout all nerve distributions; per patient report    ROM  ring Finger 3/22/2023 3/22/2023 3/22/2023   3/28/2023   AROM (PROM) R L pre L post L   MCP / HE/110     PIP HE/ -45/100  (-25/) -27/ Pre: -30 (-25)/  Post: -20/   DIP / 0/75     CONLEY           Strength:  Pain-free /Pinch Test    3/22/2023   Trials R L   1   65  70 66     3 Pt Pinch  3/22/2023   Trials R L   1   21 23       Home Program:   3/22/2023  Heat, tracking, provided sleeve for making home hot pack with rice if needed (does have hotpack at home)  Custom splint for night, PIPj in ~ -20-25 ext, DIPj included    Next visit/ Plan:   Check ROM  Remake or adjust splint - working towards 0 degrees ext passively  Splint for night  Exercise for day  Paraffin, heat  Stretch  Massage if needed

## 2023-04-26 ENCOUNTER — THERAPY VISIT (OUTPATIENT)
Dept: OCCUPATIONAL THERAPY | Facility: CLINIC | Age: 43
End: 2023-04-26
Payer: COMMERCIAL

## 2023-04-26 DIAGNOSIS — M79.645 PAIN OF FINGER OF LEFT HAND: Primary | ICD-10-CM

## 2023-04-26 PROCEDURE — 97110 THERAPEUTIC EXERCISES: CPT | Mod: GO | Performed by: OCCUPATIONAL THERAPIST

## 2023-04-26 NOTE — PROGRESS NOTES
SOAP note information for 4/26/2023.  Please refer to the daily flowsheet for treatment today, total treatment time and time spent performing 1:1 timed codes.         Diagnosis: Left ring finger PIP contracture  DOI:  9/15/2022  Post:  ~ 6 months    Pain Level (Scale 0-10)   3/22/2023   At Rest 0   With Use Pushing while the finger is in some extension - mild 2/10  Can be moderate  At worst - really bent 8/10     Pain Description  Date 3/22/2023   Location ring finger   Pain Quality Sharp with a sudden straightening. Can be bothered with basketball,    Frequency intermittent     Pain is worst  daytime   Exacerbated by  see above   Relieved by rest   Progression Motion has not improved     Edema  None visible to L RF PIPj    Sensation   WNL throughout all nerve distributions; per patient report    ROM  ring Finger 3/22/2023 3/22/2023 3/22/2023   3/28/2023 4/26/2023     AROM (PROM) R L pre L post L L   MCP / HE/110      PIP HE/ -45/100  (-25/) -27/ Pre: -30 (-25)/  Post: -20/ Pre -17/  Post-15/  (-7/ )   DIP / 0/75      CONLEY            Strength:  Pain-free /Pinch Test    3/22/2023   Trials R L   1   65  70 66     3 Pt Pinch  3/22/2023   Trials R L   1   21 23       Home Program:   4/26/2023  Heat, tracking, rev blocking, passive PIP joint extension  Custom splint for night, PIPj in ~ -5 ext, DIPj included  Splint for night  Exercise for day    Next visit/ Plan:   Check ROM  Remake or adjust splint -possible circumferential removable cast or circumferential orthosis  Paraffin, heat  Stretch  Massage if needed

## 2023-05-17 ENCOUNTER — THERAPY VISIT (OUTPATIENT)
Dept: OCCUPATIONAL THERAPY | Facility: CLINIC | Age: 43
End: 2023-05-17
Payer: COMMERCIAL

## 2023-05-17 DIAGNOSIS — M79.645 PAIN OF FINGER OF LEFT HAND: Primary | ICD-10-CM

## 2023-05-17 PROCEDURE — 97110 THERAPEUTIC EXERCISES: CPT | Mod: GO | Performed by: OCCUPATIONAL THERAPIST

## 2023-05-17 NOTE — PROGRESS NOTES
Discharge Note - Hand Therapy      Current Date:  5/17/2023    Diagnosis: Left ring finger PIP contracture  DOI:  9/15/2022  Post:  ~ 6 months    Subjective:  It seems to be doing better. Much less pain with stretching. I don't notice it much.    Objective    Pain Level (Scale 0-10)   3/22/2023   At Rest 0   With Use Pushing while the finger is in some extension - mild 2/10  Can be moderate  At worst - really bent 8/10     Pain Description  Date 3/22/2023   Location ring finger   Pain Quality Sharp with a sudden straightening. Can be bothered with basketball,    Frequency intermittent     Pain is worst  daytime   Exacerbated by  see above   Relieved by rest   Progression Motion has not improved     Edema  None visible to L RF PIPj    Sensation   WNL throughout all nerve distributions; per patient report    ROM  ring Finger 3/22/2023 3/22/2023 3/22/2023   3/28/2023 4/26/2023   5/17/2023     AROM (PROM) R L pre L post L L L   MCP / HE/110       PIP HE/ -45/100  (-25/) -27/ Pre: -30 (-25)/  Post: -20/ Pre -17/  Post-15/  (-7/ ) Pre  -14/   DIP / 0/75       CONLEY             Strength:  Pain-free /Pinch Test    3/22/2023   Trials R L   1   65  70 66     3 Pt Pinch  3/22/2023   Trials R L   1   21 23       Assessment:  Response to therapy has been improvement to:  ROM of Fingers: PIP joint - Ext  Appropriateness of Rx I have re-evaluated this patient and find that the nature, scope, duration and intensity of the therapy is appropriate for the medical condition of the patient.  Overall Assessment:  Patient is ready to be discharged from therapy and continue their home treatment program.  STG/LTG:  See goal sheet for details and updates.    Plan:  Frequency/Duration:  Discharge from Hand Therapy; continue home program.

## 2023-09-30 ENCOUNTER — HEALTH MAINTENANCE LETTER (OUTPATIENT)
Age: 43
End: 2023-09-30

## 2023-11-10 ENCOUNTER — OFFICE VISIT (OUTPATIENT)
Dept: FAMILY MEDICINE | Facility: CLINIC | Age: 43
End: 2023-11-10
Payer: COMMERCIAL

## 2023-11-10 VITALS
BODY MASS INDEX: 20.65 KG/M2 | TEMPERATURE: 98.7 F | HEART RATE: 63 BPM | DIASTOLIC BLOOD PRESSURE: 70 MMHG | RESPIRATION RATE: 16 BRPM | OXYGEN SATURATION: 98 % | WEIGHT: 135.8 LBS | SYSTOLIC BLOOD PRESSURE: 126 MMHG

## 2023-11-10 DIAGNOSIS — H61.23 BILATERAL IMPACTED CERUMEN: Primary | ICD-10-CM

## 2023-11-10 PROCEDURE — 69209 REMOVE IMPACTED EAR WAX UNI: CPT | Mod: 50 | Performed by: STUDENT IN AN ORGANIZED HEALTH CARE EDUCATION/TRAINING PROGRAM

## 2023-11-10 ASSESSMENT — PAIN SCALES - GENERAL: PAINLEVEL: NO PAIN (0)

## 2023-11-10 NOTE — PROGRESS NOTES
Assessment and Plan   42-year-old male who presents for symptoms of full feeling in the ear and irritation.  Bilateral cerumen impaction present.  Nurse completed cerumen disimpaction as below.    1. Bilateral impacted cerumen  - [place] REMOVAL IMPACTED CERUMEN IRRIGATION/LVG UNILAT  Nurse performed lavage of both ears.  Patient tolerated the procedure well.     Rafi Ji MD    Follow up: PRN    Options for treatment and follow-up care were reviewed with the patient and/or guardian. Sanjay Gomez and/or guardian engaged in the decision making process and verbalized understanding of the options discussed and agreed with the final plan.    Dr. Rafi Ji         HPI:   Sanjay Gomez is a 42 year old  male who presents for:    Chief Complaint   Patient presents with    Ear Problem     R ear feels clogged, hard to hear            PMHX:     Patient Active Problem List   Diagnosis    CARDIOVASCULAR SCREENING; LDL GOAL LESS THAN 160    Pain in joint, pelvic region and thigh       Social History     Tobacco Use    Smoking status: Never    Smokeless tobacco: Never   Substance Use Topics    Alcohol use: Yes     Alcohol/week: 1.0 standard drink of alcohol     Comment: 1-2 drinks / week    Drug use: No       Social History     Social History Narrative    Not on file       No Known Allergies    No results found for this or any previous visit (from the past 24 hour(s)).         Review of Systems:    ROS: 10 point ROS neg other than the symptoms noted above in the HPI.         Physical Exam:     Vitals:    11/10/23 1220   BP: 126/70   BP Location: Right arm   Patient Position: Sitting   Cuff Size: Adult Regular   Pulse: 63   Resp: 16   Temp: 98.7  F (37.1  C)   TempSrc: Temporal   SpO2: 98%   Weight: 61.6 kg (135 lb 12.8 oz)     Body mass index is 20.65 kg/m .    General appearance: Alert, cooperative, no distress, appears stated age  Head: Normocephalic, atraumatic, without obvious abnormality  Eyes: Pupils  equal round, reactive.  Conjunctiva clear.  Nose: Nares normal, no drainage.  Throat: Lips, mucosa, tongue normal mucosa pink and moist  Neck: Supple, symmetric, trachea midline,  Ears:  cerumen impaction bilaterally

## 2024-11-11 ENCOUNTER — VIRTUAL VISIT (OUTPATIENT)
Dept: FAMILY MEDICINE | Facility: CLINIC | Age: 44
End: 2024-11-11
Payer: COMMERCIAL

## 2024-11-11 DIAGNOSIS — J01.10 ACUTE NON-RECURRENT FRONTAL SINUSITIS: Primary | ICD-10-CM

## 2024-11-11 PROCEDURE — 99213 OFFICE O/P EST LOW 20 MIN: CPT | Mod: 95 | Performed by: NURSE PRACTITIONER

## 2024-11-11 NOTE — PROGRESS NOTES
Sanjay is a 43 year old who is being evaluated via a billable video visit.    How would you like to obtain your AVS? MyChart  If the video visit is dropped, the invitation should be resent by: Text to cell phone: [phone]  Will anyone else be joining your video visit? No      Assessment & Plan     Acute non-recurrent frontal sinusitis  Symptoms ongoing for 3weeks, worsening despite OTC therapy.  Reasonable to treat with antibiotics at this point.  Recommended he continue symptomatic care with over-the-counter medications including Mucinex, decongestants, Tylenol, ibuprofen.  Okay to continue nasal lavage and I also recommended Flonase as an alternative.  Consider humidifier and steam treatments.  Follow-up if symptoms worsen or fail to improve.   - amoxicillin-clavulanate (AUGMENTIN) 875-125 MG tablet; Take 1 tablet by mouth 2 times daily.        Subjective   Sanjay is a 43 year old, presenting for the following health issues:  Sinus Problem    History of Present Illness       Reason for visit:  Sinus infection  Symptom onset:  3-4 weeks ago  Symptoms include:  Yellow mucus, pressure in head and face  Symptom intensity:  Moderate  Symptom progression:  Staying the same  Had these symptoms before:  Yes  Has tried/received treatment for these symptoms:  Yes  Previous treatment was successful:  Yes  Prior treatment description:  Amoxicillin  What makes it better:  Sinus rinses gives temporary relief   He is taking medications regularly.       Tooth mouth, facial pain, especially on left.  No recent antibiotics  Gets this about every 5 years or so.              Objective           Vitals:  No vitals were obtained today due to virtual visit.    Physical Exam   GENERAL: alert and no distress  EYES: Eyes grossly normal to inspection.  No discharge or erythema, or obvious scleral/conjunctival abnormalities.  RESP: No audible wheeze, cough, or visible cyanosis.    SKIN: Visible skin clear. No significant rash,  abnormal pigmentation or lesions.  NEURO: Cranial nerves grossly intact.  Mentation and speech appropriate for age.  PSYCH: Appropriate affect, tone, and pace of words          Video-Visit Details    Type of service:  Video Visit   Originating Location (pt. Location): Home    Distant Location (provider location):  Off-site  Platform used for Video Visit: Rosa  Signed Electronically by: Elisabeth Henry DNP

## 2024-11-23 ENCOUNTER — HEALTH MAINTENANCE LETTER (OUTPATIENT)
Age: 44
End: 2024-11-23